# Patient Record
(demographics unavailable — no encounter records)

---

## 2024-11-05 NOTE — HISTORY OF PRESENT ILLNESS
[FreeTextEntry1] : Mr. Jhaveri presents today for a blood pressure check after addition of hydralazine 25mg BID.  Admits he did not take his medications yet this morning because he hasn't eaten.  Currently without complaints of exertional chest pain, shortness of breath, palpitations, lightheadedness or syncope.

## 2024-11-05 NOTE — CARDIOLOGY SUMMARY
[de-identified] : Sinus rhythm at 63 bpm.  Incomplete right bundle branch block.  Poor R-wave progression V1-V3.  No acute ischemic changes.

## 2024-11-05 NOTE — DISCUSSION/SUMMARY
[EKG obtained to assist in diagnosis and management of assessed problem(s)] : EKG obtained to assist in diagnosis and management of assessed problem(s) [FreeTextEntry1] : 1 - Dialysis -induced autosomal dysfunction:  blood pressure elevated today.   Presents today for a blood pressure check after addition of hydralazine 25mg BID.  Admits he did not take his medications yet this morning because he hasn't eaten. Advised patient to take his medications prior to his appointment or make his appointment later in the day.  Discussed low sodium diet and foods to avoid.  2 - Follow up in 4 weeks for blood pressure check.

## 2024-11-05 NOTE — PHYSICAL EXAM
[Well Developed] : well developed [Well Nourished] : well nourished [No Acute Distress] : no acute distress [Normal Conjunctiva] : normal conjunctiva [Normal Venous Pressure] : normal venous pressure [No Carotid Bruit] : no carotid bruit [Normal S1, S2] : normal S1, S2 [No Murmur] : no murmur [No Rub] : no rub [No Gallop] : no gallop [Clear Lung Fields] : clear lung fields [Normal Bowel Sounds] : normal bowel sounds [Normal Gait] : normal gait [No Edema] : no edema [Normal] : no rash, no skin lesions [Moves all extremities] : moves all extremities [No Focal Deficits] : no focal deficits [Normal Speech] : normal speech [Alert and Oriented] : alert and oriented [Normal memory] : normal memory

## 2024-11-05 NOTE — REASON FOR VISIT
[FreeTextEntry1] : Mr. 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